# Patient Record
Sex: MALE | Race: WHITE | Employment: OTHER | URBAN - METROPOLITAN AREA
[De-identification: names, ages, dates, MRNs, and addresses within clinical notes are randomized per-mention and may not be internally consistent; named-entity substitution may affect disease eponyms.]

---

## 2021-11-16 ENCOUNTER — TELEPHONE (OUTPATIENT)
Dept: GASTROENTEROLOGY | Facility: CLINIC | Age: 56
End: 2021-11-16

## 2021-11-16 NOTE — TELEPHONE ENCOUNTER
----- Message from Annie Reich RN sent at 12/16/2016  3:35 PM CST -----  Regarding: CLN recall  5 year CLN recall, per Dr. Miguel Dhillon; CLN done 12/14/16

## (undated) NOTE — LETTER
11/16/2021    Deepali Koo        [address] [facility]            Dear Deepali Koo,      [facility] records indicate that you are due for an appointment for a Colonoscopy in December 2021, or sometime there after, with Yasmine Palomares